# Patient Record
Sex: FEMALE | Race: WHITE | Employment: FULL TIME | URBAN - METROPOLITAN AREA
[De-identification: names, ages, dates, MRNs, and addresses within clinical notes are randomized per-mention and may not be internally consistent; named-entity substitution may affect disease eponyms.]

---

## 2016-09-29 LAB
HBV SURFACE AG SERPL QL IA: NEGATIVE
HIV 1+2 AB+HIV1 P24 AG SERPL QL IA: NEGATIVE
RUBELLA ANTIBODY IGG QUANTITATIVE: NORMAL IU/ML
T PALLIDUM IGG SER QL: NEGATIVE

## 2017-04-14 LAB — GROUP B STREP PCR: NEGATIVE

## 2017-05-04 ENCOUNTER — ANESTHESIA EVENT (OUTPATIENT)
Dept: OBGYN | Facility: CLINIC | Age: 32
End: 2017-05-04
Payer: COMMERCIAL

## 2017-05-04 ENCOUNTER — ANESTHESIA (OUTPATIENT)
Dept: OBGYN | Facility: CLINIC | Age: 32
End: 2017-05-04
Payer: COMMERCIAL

## 2017-05-04 ENCOUNTER — HOSPITAL ENCOUNTER (INPATIENT)
Facility: CLINIC | Age: 32
LOS: 1 days | Discharge: HOME | End: 2017-05-05
Attending: ADVANCED PRACTICE MIDWIFE | Admitting: ADVANCED PRACTICE MIDWIFE
Payer: COMMERCIAL

## 2017-05-04 LAB
ABO + RH BLD: NORMAL
ABO + RH BLD: NORMAL
BASOPHILS # BLD AUTO: 0 10E9/L (ref 0–0.2)
BASOPHILS NFR BLD AUTO: 0.1 %
BLD GP AB SCN SERPL QL: NORMAL
BLOOD BANK CMNT PATIENT-IMP: NORMAL
DIFFERENTIAL METHOD BLD: ABNORMAL
EOSINOPHIL # BLD AUTO: 0 10E9/L (ref 0–0.7)
EOSINOPHIL NFR BLD AUTO: 0.1 %
ERYTHROCYTE [DISTWIDTH] IN BLOOD BY AUTOMATED COUNT: 13.2 % (ref 10–15)
HCT VFR BLD AUTO: 36.6 % (ref 35–47)
HGB BLD-MCNC: 12.7 G/DL (ref 11.7–15.7)
IMM GRANULOCYTES # BLD: 0.1 10E9/L (ref 0–0.4)
IMM GRANULOCYTES NFR BLD: 0.3 %
LYMPHOCYTES # BLD AUTO: 1 10E9/L (ref 0.8–5.3)
LYMPHOCYTES NFR BLD AUTO: 4.9 %
MCH RBC QN AUTO: 31.8 PG (ref 26.5–33)
MCHC RBC AUTO-ENTMCNC: 34.7 G/DL (ref 31.5–36.5)
MCV RBC AUTO: 92 FL (ref 78–100)
MONOCYTES # BLD AUTO: 1.6 10E9/L (ref 0–1.3)
MONOCYTES NFR BLD AUTO: 8.1 %
NEUTROPHILS # BLD AUTO: 16.6 10E9/L (ref 1.6–8.3)
NEUTROPHILS NFR BLD AUTO: 86.5 %
NRBC # BLD AUTO: 0 10*3/UL
NRBC BLD AUTO-RTO: 0 /100
PLATELET # BLD AUTO: 250 10E9/L (ref 150–450)
RBC # BLD AUTO: 3.99 10E12/L (ref 3.8–5.2)
SPECIMEN EXP DATE BLD: NORMAL
WBC # BLD AUTO: 19.2 10E9/L (ref 4–11)

## 2017-05-04 PROCEDURE — 86780 TREPONEMA PALLIDUM: CPT | Performed by: ADVANCED PRACTICE MIDWIFE

## 2017-05-04 PROCEDURE — 37000011 ZZH ANESTHESIA WARD SERVICE

## 2017-05-04 PROCEDURE — 0KQM0ZZ REPAIR PERINEUM MUSCLE, OPEN APPROACH: ICD-10-PCS | Performed by: ADVANCED PRACTICE MIDWIFE

## 2017-05-04 PROCEDURE — 25000125 ZZHC RX 250: Performed by: SURGERY

## 2017-05-04 PROCEDURE — 25000128 H RX IP 250 OP 636: Performed by: SURGERY

## 2017-05-04 PROCEDURE — 25000125 ZZHC RX 250: Performed by: ADVANCED PRACTICE MIDWIFE

## 2017-05-04 PROCEDURE — 25000128 H RX IP 250 OP 636: Performed by: ADVANCED PRACTICE MIDWIFE

## 2017-05-04 PROCEDURE — 12000037 ZZH R&B POSTPARTUM INTERMEDIATE

## 2017-05-04 PROCEDURE — 86900 BLOOD TYPING SEROLOGIC ABO: CPT | Performed by: ADVANCED PRACTICE MIDWIFE

## 2017-05-04 PROCEDURE — 86850 RBC ANTIBODY SCREEN: CPT | Performed by: ADVANCED PRACTICE MIDWIFE

## 2017-05-04 PROCEDURE — 3E0R3CZ INTRODUCTION OF REGIONAL ANESTHETIC INTO SPINAL CANAL, PERCUTANEOUS APPROACH: ICD-10-PCS | Performed by: SURGERY

## 2017-05-04 PROCEDURE — 25000132 ZZH RX MED GY IP 250 OP 250 PS 637: Performed by: ADVANCED PRACTICE MIDWIFE

## 2017-05-04 PROCEDURE — 85025 COMPLETE CBC W/AUTO DIFF WBC: CPT | Performed by: ADVANCED PRACTICE MIDWIFE

## 2017-05-04 PROCEDURE — 25800025 ZZH RX 258: Performed by: ADVANCED PRACTICE MIDWIFE

## 2017-05-04 PROCEDURE — 86901 BLOOD TYPING SEROLOGIC RH(D): CPT | Performed by: ADVANCED PRACTICE MIDWIFE

## 2017-05-04 PROCEDURE — 00HU33Z INSERTION OF INFUSION DEVICE INTO SPINAL CANAL, PERCUTANEOUS APPROACH: ICD-10-PCS | Performed by: SURGERY

## 2017-05-04 PROCEDURE — 36415 COLL VENOUS BLD VENIPUNCTURE: CPT | Performed by: ADVANCED PRACTICE MIDWIFE

## 2017-05-04 PROCEDURE — 72200001 ZZH LABOR CARE VAGINAL DELIVERY SINGLE

## 2017-05-04 RX ORDER — OXYCODONE AND ACETAMINOPHEN 5; 325 MG/1; MG/1
1 TABLET ORAL
Status: DISCONTINUED | OUTPATIENT
Start: 2017-05-04 | End: 2017-05-04

## 2017-05-04 RX ORDER — OXYTOCIN/0.9 % SODIUM CHLORIDE 30/500 ML
1-24 PLASTIC BAG, INJECTION (ML) INTRAVENOUS CONTINUOUS
Status: DISCONTINUED | OUTPATIENT
Start: 2017-05-04 | End: 2017-05-04

## 2017-05-04 RX ORDER — NALOXONE HYDROCHLORIDE 0.4 MG/ML
.1-.4 INJECTION, SOLUTION INTRAMUSCULAR; INTRAVENOUS; SUBCUTANEOUS
Status: DISCONTINUED | OUTPATIENT
Start: 2017-05-04 | End: 2017-05-05 | Stop reason: HOSPADM

## 2017-05-04 RX ORDER — ROPIVACAINE HYDROCHLORIDE 2 MG/ML
10 INJECTION, SOLUTION EPIDURAL; INFILTRATION; PERINEURAL ONCE
Status: COMPLETED | OUTPATIENT
Start: 2017-05-04 | End: 2017-05-04

## 2017-05-04 RX ORDER — AMOXICILLIN 250 MG
1-2 CAPSULE ORAL 2 TIMES DAILY
Status: DISCONTINUED | OUTPATIENT
Start: 2017-05-04 | End: 2017-05-05 | Stop reason: HOSPADM

## 2017-05-04 RX ORDER — NALOXONE HYDROCHLORIDE 0.4 MG/ML
.1-.4 INJECTION, SOLUTION INTRAMUSCULAR; INTRAVENOUS; SUBCUTANEOUS
Status: DISCONTINUED | OUTPATIENT
Start: 2017-05-04 | End: 2017-05-04

## 2017-05-04 RX ORDER — BISACODYL 10 MG
10 SUPPOSITORY, RECTAL RECTAL DAILY PRN
Status: DISCONTINUED | OUTPATIENT
Start: 2017-05-06 | End: 2017-05-05 | Stop reason: HOSPADM

## 2017-05-04 RX ORDER — EPHEDRINE SULFATE 50 MG/ML
5 INJECTION, SOLUTION INTRAMUSCULAR; INTRAVENOUS; SUBCUTANEOUS
Status: DISCONTINUED | OUTPATIENT
Start: 2017-05-04 | End: 2017-05-04

## 2017-05-04 RX ORDER — ACETAMINOPHEN 325 MG/1
650 TABLET ORAL EVERY 4 HOURS PRN
Status: DISCONTINUED | OUTPATIENT
Start: 2017-05-04 | End: 2017-05-05 | Stop reason: HOSPADM

## 2017-05-04 RX ORDER — OXYTOCIN/0.9 % SODIUM CHLORIDE 30/500 ML
340 PLASTIC BAG, INJECTION (ML) INTRAVENOUS CONTINUOUS PRN
Status: DISCONTINUED | OUTPATIENT
Start: 2017-05-04 | End: 2017-05-05 | Stop reason: HOSPADM

## 2017-05-04 RX ORDER — OXYTOCIN/0.9 % SODIUM CHLORIDE 30/500 ML
100 PLASTIC BAG, INJECTION (ML) INTRAVENOUS CONTINUOUS
Status: DISCONTINUED | OUTPATIENT
Start: 2017-05-04 | End: 2017-05-05

## 2017-05-04 RX ORDER — SODIUM CHLORIDE, SODIUM LACTATE, POTASSIUM CHLORIDE, CALCIUM CHLORIDE 600; 310; 30; 20 MG/100ML; MG/100ML; MG/100ML; MG/100ML
INJECTION, SOLUTION INTRAVENOUS CONTINUOUS
Status: DISCONTINUED | OUTPATIENT
Start: 2017-05-04 | End: 2017-05-04

## 2017-05-04 RX ORDER — OXYTOCIN/0.9 % SODIUM CHLORIDE 30/500 ML
100-340 PLASTIC BAG, INJECTION (ML) INTRAVENOUS CONTINUOUS PRN
Status: DISCONTINUED | OUTPATIENT
Start: 2017-05-04 | End: 2017-05-04

## 2017-05-04 RX ORDER — CARBOPROST TROMETHAMINE 250 UG/ML
250 INJECTION, SOLUTION INTRAMUSCULAR
Status: DISCONTINUED | OUTPATIENT
Start: 2017-05-04 | End: 2017-05-04

## 2017-05-04 RX ORDER — IBUPROFEN 800 MG/1
800 TABLET, FILM COATED ORAL
Status: DISCONTINUED | OUTPATIENT
Start: 2017-05-04 | End: 2017-05-04

## 2017-05-04 RX ORDER — LIDOCAINE 40 MG/G
CREAM TOPICAL
Status: DISCONTINUED | OUTPATIENT
Start: 2017-05-04 | End: 2017-05-04

## 2017-05-04 RX ORDER — ONDANSETRON 2 MG/ML
4 INJECTION INTRAMUSCULAR; INTRAVENOUS EVERY 6 HOURS PRN
Status: DISCONTINUED | OUTPATIENT
Start: 2017-05-04 | End: 2017-05-04

## 2017-05-04 RX ORDER — IBUPROFEN 400 MG/1
400-800 TABLET, FILM COATED ORAL EVERY 6 HOURS PRN
Status: DISCONTINUED | OUTPATIENT
Start: 2017-05-04 | End: 2017-05-05 | Stop reason: HOSPADM

## 2017-05-04 RX ORDER — METHYLERGONOVINE MALEATE 0.2 MG/ML
200 INJECTION INTRAVENOUS
Status: DISCONTINUED | OUTPATIENT
Start: 2017-05-04 | End: 2017-05-04

## 2017-05-04 RX ORDER — ACETAMINOPHEN 325 MG/1
650 TABLET ORAL EVERY 4 HOURS PRN
Status: DISCONTINUED | OUTPATIENT
Start: 2017-05-04 | End: 2017-05-04

## 2017-05-04 RX ORDER — FENTANYL CITRATE 50 UG/ML
100 INJECTION, SOLUTION INTRAMUSCULAR; INTRAVENOUS ONCE
Status: COMPLETED | OUTPATIENT
Start: 2017-05-04 | End: 2017-05-04

## 2017-05-04 RX ORDER — OXYTOCIN 10 [USP'U]/ML
10 INJECTION, SOLUTION INTRAMUSCULAR; INTRAVENOUS
Status: COMPLETED | OUTPATIENT
Start: 2017-05-04 | End: 2017-05-04

## 2017-05-04 RX ORDER — OXYTOCIN/0.9 % SODIUM CHLORIDE 30/500 ML
PLASTIC BAG, INJECTION (ML) INTRAVENOUS
Status: DISCONTINUED
Start: 2017-05-04 | End: 2017-05-04 | Stop reason: HOSPADM

## 2017-05-04 RX ORDER — HYDROCORTISONE 2.5 %
CREAM (GRAM) TOPICAL 3 TIMES DAILY PRN
Status: DISCONTINUED | OUTPATIENT
Start: 2017-05-04 | End: 2017-05-05 | Stop reason: HOSPADM

## 2017-05-04 RX ORDER — MISOPROSTOL 200 UG/1
400 TABLET ORAL
Status: DISCONTINUED | OUTPATIENT
Start: 2017-05-04 | End: 2017-05-05 | Stop reason: HOSPADM

## 2017-05-04 RX ORDER — NALBUPHINE HYDROCHLORIDE 10 MG/ML
2.5-5 INJECTION, SOLUTION INTRAMUSCULAR; INTRAVENOUS; SUBCUTANEOUS EVERY 6 HOURS PRN
Status: DISCONTINUED | OUTPATIENT
Start: 2017-05-04 | End: 2017-05-04

## 2017-05-04 RX ORDER — LANOLIN 100 %
OINTMENT (GRAM) TOPICAL
Status: DISCONTINUED | OUTPATIENT
Start: 2017-05-04 | End: 2017-05-05 | Stop reason: HOSPADM

## 2017-05-04 RX ORDER — OXYTOCIN 10 [USP'U]/ML
10 INJECTION, SOLUTION INTRAMUSCULAR; INTRAVENOUS
Status: DISCONTINUED | OUTPATIENT
Start: 2017-05-04 | End: 2017-05-05 | Stop reason: HOSPADM

## 2017-05-04 RX ADMIN — SODIUM CHLORIDE, POTASSIUM CHLORIDE, SODIUM LACTATE AND CALCIUM CHLORIDE: 600; 310; 30; 20 INJECTION, SOLUTION INTRAVENOUS at 11:00

## 2017-05-04 RX ADMIN — FENTANYL CITRATE 100 MCG: 50 INJECTION, SOLUTION INTRAMUSCULAR; INTRAVENOUS at 11:07

## 2017-05-04 RX ADMIN — Medication 2 MILLI-UNITS/MIN: at 13:55

## 2017-05-04 RX ADMIN — SENNOSIDES AND DOCUSATE SODIUM 1 TABLET: 8.6; 5 TABLET ORAL at 22:52

## 2017-05-04 RX ADMIN — ROPIVACAINE HYDROCHLORIDE 3 ML: 2 INJECTION, SOLUTION EPIDURAL; INFILTRATION at 11:07

## 2017-05-04 RX ADMIN — LIDOCAINE HYDROCHLORIDE 20 ML: 10 INJECTION, SOLUTION INFILTRATION; PERINEURAL at 18:35

## 2017-05-04 RX ADMIN — IBUPROFEN 800 MG: 400 TABLET ORAL at 20:46

## 2017-05-04 RX ADMIN — SODIUM CHLORIDE, POTASSIUM CHLORIDE, SODIUM LACTATE AND CALCIUM CHLORIDE 1000 ML: 600; 310; 30; 20 INJECTION, SOLUTION INTRAVENOUS at 10:10

## 2017-05-04 RX ADMIN — ROPIVACAINE HYDROCHLORIDE 3 ML: 2 INJECTION, SOLUTION EPIDURAL; INFILTRATION at 11:06

## 2017-05-04 RX ADMIN — ROPIVACAINE HYDROCHLORIDE 4 ML: 2 INJECTION, SOLUTION EPIDURAL; INFILTRATION at 11:08

## 2017-05-04 RX ADMIN — Medication 10 ML/HR: at 11:30

## 2017-05-04 RX ADMIN — ACETAMINOPHEN 650 MG: 325 TABLET, FILM COATED ORAL at 20:46

## 2017-05-04 RX ADMIN — OXYTOCIN 10 UNITS: 10 INJECTION, SOLUTION INTRAMUSCULAR; INTRAVENOUS at 18:05

## 2017-05-04 NOTE — ANESTHESIA PREPROCEDURE EVALUATION
"Procedure: * No procedures listed *  Preop diagnosis: * No pre-op diagnosis entered *    Allergies   Allergen Reactions     Seasonal Allergies      No past medical history on file.  Past Surgical History:   Procedure Laterality Date     DILATION AND CURETTAGE SUCTION N/A 10/2/2015    Procedure: DILATION AND CURETTAGE SUCTION;  Surgeon: Dalia Lucas MD;  Location: [facility]     ENT SURGERY      wisdom teeth extraction @ age 27     GYN SURGERY      suction D & C 10/2015     Prior to Admission medications    Medication Sig Start Date End Date Taking? Authorizing Provider   Prenatal MV-Min-Fe Fum-FA-DHA (PRENATAL 1 PO)    Yes Reported, Patient     Current Facility-Administered Medications Ordered in Epic   Medication Dose Route Frequency Last Rate Last Dose     lactated ringers infusion   Intravenous Continuous 125 mL/hr at 05/04/17 1100       lactated ringers BOLUS 500 mL  500 mL Intravenous Once PRN         acetaminophen (TYLENOL) tablet 650 mg  650 mg Oral Q4H PRN         naloxone (NARCAN) injection 0.1-0.4 mg  0.1-0.4 mg Intravenous Q2 Min PRN         ondansetron (ZOFRAN) injection 4 mg  4 mg Intravenous Q6H PRN         oxytocin (PITOCIN) injection 10 Units  10 Units Intramuscular Once PRN         oxytocin (PITOCIN) 30 units in 500 mL 0.9% NaCl infusion  100-340 mL/hr Intravenous Continuous PRN         Medication Instructions: misoprostol (CYTOTEC)- Nurse to discuss ordering with provider, if needed. Ordered via \"OB misoprostol (CYTOTEC) Postpartum Hemorrhage PANEL\"   Does not apply Continuous PRN         methylergonovine (METHERGINE) injection 200 mcg  200 mcg Intramuscular Once PRN         carboprost (HEMABATE) injection 250 mcg  250 mcg Intramuscular Once PRN         lidocaine 1 % 0.1-20 mL  0.1-20 mL Subcutaneous Once PRN         ibuprofen (ADVIL/MOTRIN) tablet 800 mg  800 mg Oral Once PRN         oxyCODONE-acetaminophen (PERCOCET) 5-325 MG per tablet 1 tablet  1 tablet Oral Once PRN         medication " instruction   Does not apply Continuous PRN         Opioid plan postpartum - medication instruction   Does not apply Continuous PRN         ropivacaine (NAROPIN) injection 10 mL  10 mL EPIDURAL Once         fentaNYL Citrate (PF) (SUBLIMAZE) injection 100 mcg  100 mcg EPIDURAL Once         fentaNYL (SUBLIMAZE) 2 mcg/mL, ropivacaine (NAROPIN) 0.2% in NaCl 0.9% EPIDURAL infusion   EPIDURAL Continuous 10 mL/hr at 05/04/17 1130 10 mL/hr at 05/04/17 1130     lactated ringers BOLUS 250 mL  250 mL Intravenous Once PRN         ePHEDrine injection 5 mg  5 mg Intravenous Q3 Min PRN         nalbuphine (NUBAIN) injection 2.5-5 mg  2.5-5 mg Intravenous Q6H PRN         naloxone (NARCAN) injection 0.1-0.4 mg  0.1-0.4 mg Intravenous Q2 Min PRN         medication instruction   Does not apply Continuous PRN         oxytocin in 0.9% NaCl (PITOCIN) 30 units/500 mL infusion             lidocaine 1 % 1 mL  1 mL Other Q1H PRN         lidocaine (LMX4) cream   Topical Q1H PRN         sodium chloride (PF) 0.9% PF flush 3 mL  3 mL Intracatheter Q1H PRN         sodium chloride (PF) 0.9% PF flush 3 mL  3 mL Intracatheter Q8H         oxytocin (PITOCIN) 30 units in 500 mL 0.9% NaCl infusion  1-24 jac-units/min Intravenous Continuous         No current [facility]-ordered outpatient prescriptions on file.     Wt Readings from Last 1 Encounters:   05/04/17 56.2 kg (124 lb)     Temp Readings from Last 1 Encounters:   05/04/17 37.4  C (99.4  F) (Temporal)     BP Readings from Last 6 Encounters:   05/04/17 100/55   10/02/15 113/77   06/06/15 92/62     Pulse Readings from Last 4 Encounters:   05/04/17 87   10/02/15 79   06/06/15 73     Resp Readings from Last 1 Encounters:   05/04/17 16     SpO2 Readings from Last 1 Encounters:   05/04/17 94%     No results for input(s): NA, POTASSIUM, CHLORIDE, CO2, ANIONGAP, GLC, BUN, CR, GEREMIAS in the last 41501 hours.  Recent Labs   Lab Test  05/04/17   1055   WBC  19.2*   HGB  12.7   PLT  250     No results for input(s):  INR in the last 67162 hours.    Invalid input(s): APTT   RECENT LABS:   ECG:   ECHO:   CXR:      Anesthesia Evaluation       history and physical reviewed .             ROS/MED HX    ENT/Pulmonary:  - neg pulmonary ROS     Neurologic:  - neg neurologic ROS     Cardiovascular:  - neg cardiovascular ROS       METS/Exercise Tolerance:     Hematologic:         Musculoskeletal:         GI/Hepatic:  - neg GI/hepatic ROS       Renal/Genitourinary:         Endo:         Psychiatric:         Infectious Disease:         Malignancy:         Other:                     Physical Exam  Normal systems: cardiovascular and pulmonary    Airway   Mallampati: II  TM distance: > 3 FB  Neck ROM: full  Mouth opening: > 3 cm    Dental     Cardiovascular       Pulmonary           neg OB ROS                 Anesthesia Plan      History & Physical Review      ASA Status:  .  OB Epidural Asa: 2            Postoperative Care      Consents  Anesthetic plan, risks, benefits and alternatives discussed with:  Patient..                          .

## 2017-05-04 NOTE — PROGRESS NOTES
"S: Adan is resting between ctx and getting small periods of sleep. She perceives right lower back pain with some ctx otherwise describes an effective epidural. Continued LOF clear in color. Drinking between periods of wakefullness. Support team at bedside.  O: VSS /55  Pulse 87  Temp 99.4  F (37.4  C) (Temporal)  Resp 16  Ht 1.499 m (4' 11\")  Wt 56.2 kg (124 lb)  LMP 2016  SpO2 94%  BMI 25.04 kg/m2  Ctx spaced every 7-10 minutes, firm to palpation, 60-90 seconds in duration. FHR 130s with moderate variability, positive accelerations, no decelerations. SVE deferred at this time.  A: Adan is a 33 yo  at 39.3 weeks of gestation  Epidural pain management in place  GBS negative  B+ blood type  Category I FHR tracing  P: Discussed at length labor pattern and benefits, risks, and alternatives to augmentation of labor with pitocin. Pt and partner asked appropriate questions and verbalized understanding. Pt agreed to plan of care to start pitocin at this time.  Start pitocin and increase as tolerated per protocol.  Continue epidural pain relief  Labor down and rest  Encourage frequent position changes and fluids as tolerated  VS and CEFM per policy  Reassess in 2-3 hours or PRN    CORIE Perez, CNM  "

## 2017-05-04 NOTE — IP AVS SNAPSHOT
[address]    [place] [address]    Phone:  [phone]                                       After Visit Summary   5/4/2017    Adan Sheridan    MRN: [id number]           After Visit Summary Signature Page     I have received my discharge instructions, and my questions have been answered. I have discussed any challenges I see with this plan with the nurse or doctor.    ..........................................................................................................................................  Patient/Patient Representative Signature      ..........................................................................................................................................  Patient Representative Print Name and Relationship to Patient    ..................................................               ................................................  Date                                            Time    ..........................................................................................................................................  Reviewed by Signature/Title    ...................................................              ..............................................  Date                                                            Time

## 2017-05-04 NOTE — PLAN OF CARE
1005: Patient arrived to floor. Patient had been at the [facility] and had not progressed from 9.5cm dilation and was requesting to have an epidural. Patient is 39+3. Monitors applied with patient consent. IV placed and IV fluids started for epidural process.

## 2017-05-04 NOTE — H&P
May 4, 2017    Adan Sheridan  [id number]            OB Admit History & Physical      Ms. Sheridan  is here as a transfer from [facility] where she has been in early labor since  and active labor since  on 5/3. She currently reports very intense ctx and LBP for which she desired pain management. She has been well hydrated and eating small amounts frequently. She is able to void but needed straight cath which yielded 500 cc clear urine at 0900. Her support team includes her  Abdulkadir and her  Sofya. AROM at 0500 for clear fluid. She continues to leak clear fluid.      No LMP recorded. Patient is pregnant. LMP is 16  Her Estimated Date of Delivery: 17 Data Unavailable  , making her 39.3 wks.      Estimated body mass index is 19.51 kg/(m^2) as calculated from the following:    Height as of 10/2/15: 1.524 m (5').    Weight as of 10/2/15: 45.3 kg (99 lb 14.4 oz).  Her prenatal course has been normal and healthy. She has a history of a molar pregnancy.    See prenatal for labs.  Negative GBS, Rubella  Immune, RH positive    Estimated fetal weight= 7 lbs       She is a 32 year old   Her OB history:   Obstetric History       T0      TAB0   SAB0   E0   M0   L0       # Outcome Date GA Lbr Jose Elias/2nd Weight Sex Delivery Anes PTL Lv   1 Current                      No past medical history on file.  History significant for molar pregnancy in     Past Surgical History:   Procedure Laterality Date     DILATION AND CURETTAGE SUCTION N/A 10/2/2015    Procedure: DILATION AND CURETTAGE SUCTION;  Surgeon: Dalia Lucas MD;  Location: [facility]     ENT SURGERY      wisdom teeth extraction @ age 27     GYN SURGERY      suction D & C 10/2015         No current outpatient prescriptions on file.       Allergies: Seasonal allergies      REVIEW OF SYSTEMS:  NEUROLOGIC:  Negative  EYES:  Negative  ENT:  Negative  GI:  Negative  BREAST:  Negative  :  Negative  GYN:  Negative  CV:   Negative  PULMONARY:  Negative  MUSCULOSKELETAL:  Negative  PSYCH:  Negative        Social History     Social History     Marital status:      Spouse name: N/A     Number of children: N/A     Years of education: N/A     Occupational History     Not on file.     Social History Main Topics     Smoking status: Never Smoker     Smokeless tobacco: Never Used     Alcohol use Yes      Comment: occasional     Drug use: Not on file     Sexual activity: Not on file     Other Topics Concern     Not on file     Social History Narrative      No family history on file.          Vitals:   FHT in the 130s, positive accelerations, no decelerations.  With contractions every  5-6min, lasting 90 seconds and firm to palpation.    Alert Awake in NAD  HEENT grossly normal  Neck: no lymphadenopathy or thryoidomegaly  Lungs clear bilaterally to auscultation  Heart RRR  ABD gravid, non-tender exam with contractions palpable  Pelvic:  clear fluid noted, no blood noted  Cervix is 9.5 cm / 100% effaced at -1 station  EXT:  no edema or calf tenderness  Neuro:  AAOx3    Assessment:  Adan is a 31 yo  at 39.3 who is in active labor and desires epidural pain management    Plan:  Admit to labor and delivery  Consultation with anesthesia  Encourage rest and frequent position changes  Ice chips as tolerated  VS and CEFM per policy    Yadira Encinas, APRN, CNM      Yadira Encinas MD  Dept of OB/GYN  May 4, 2017

## 2017-05-04 NOTE — PLAN OF CARE
Viable baby boy born at 1747. Apgars 8/9. Placenta delivered at 1756. Baby placed skin to skin at 1748. LINDSEY Encinas CNM at delivery and continuously at bedside since 1530. Patient also had 1:1 care with a . Dr. Crowe present for vaginal repair.

## 2017-05-04 NOTE — ANESTHESIA PROCEDURE NOTES
Peripheral nerve/Neuraxial procedure note : epidural catheter  Pre-Procedure  Performed by CLINTON ANDERSEN  Location: OB      Pre-Anesthestic Checklist: patient identified, IV checked, risks and benefits discussed, informed consent, monitors and equipment checked, pre-op evaluation and at physician/surgeon's request    Timeout  Correct Patient: Yes   Correct Procedure: Yes   Correct Site: Yes   Correct Laterality: N/A   Correct Position: Yes   Site Marked: N/A   .   Procedure Documentation    .    Procedure:    Epidural catheter.  Insertion Site:L3-4  (midline approach) Injection technique: LORT saline   Local skin infiltrated with 3 mL of 1% lidocaine.  NISHI at 5 cm     Patient Prep;mask, sterile gloves, povidone-iodine 7.5% surgical scrub, patient draped.  .  Needle: Touhy needle (17 G. 3.5 in). # of attempts: 1. # of redirects:: 0..  Spinal Needle: . . . Catheter: 19 G . .  Catheter threaded easily  4 cm epidural space.  9 cm at skin.   .    Assessment/Narrative  Paresthesias: No.  .  .  Aspiration negative for heme or CSF  . Test dose of 3 mL lidocaine 1.5% w/ 1:200,000 epinephrine at. Test dose negative for signs of intravascular, subdural or intrathecal injection. Comments:  Pt tolerated well. No complications.   Catheter taped sterile and securely with sterile medical adhesive spray and tegaderm.   Pt placed back in supine with HARRY.   FHTs stable post-procedure.

## 2017-05-04 NOTE — PROGRESS NOTES
"S: Adan is feeling comfortable with epidural in place. She is tolerating most ctx with an occasional painful one which is felt mostly in her right lower back. She is drinking apple juice and making frequent position changes. Reports continued LOF which remains clear. Support team bedside.  O: VSS /67  Pulse 87  Temp 99.4  F (37.4  C) (Temporal)  Resp 18  Ht 1.499 m (4' 11\")  Wt 56.2 kg (124 lb)  LMP 2016  SpO2 95%  BMI 25.04 kg/m2  Ctx every 6 minutes and firm to palpation, lasting  seconds. FHR 130s with moderate variability, periods of minimal variability consistent with fetal sleeping patterns, accelerations present, no decelerations present.  SVE A Lip/100/-1, clear fluid. Bladder straight cathed for approximately 400 cc clear urine.  A: Adan is a 31 yo  at 39.3 weeks gestation in active labor  Epidural pain management in place  GBS negative  B+ blood type  Category I FHR tracing  P: Continue epidural pain relief  Labor down and rest  Encourage frequent position changes and fluids as tolerated  VS and CEFM per policy  Reassess in 2-3 hours or PRN      Yadira Encinas, CORIE, CNM  "

## 2017-05-04 NOTE — IP AVS SNAPSHOT
MRN:[id number]                      After Visit Summary   5/4/2017    Adan Sheridan    MRN: [id number]           Thank you!     Thank you for choosing [facility] for your care. Our goal is always to provide you with excellent care. Hearing back from our patients is one way we can continue to improve our services. Please take a few minutes to complete the written survey that you may receive in the mail after you visit with us. Thank you!        Patient Information     Date Of Birth          1985        About your hospital stay     You were admitted on:  May 4, 2017 You last received care in the:  [address]    You were discharged on:  May 5, 2017       Who to Call     For medical emergencies, please call 911.  For non-urgent questions about your medical care, please call your primary care provider or clinic, None          Attending Provider     Provider Specialty    Tamica Diaz APRN [facility]       Primary Care Provider    None       No address on file        After Care Instructions     Activity       Review discharge instructions            Diet       Resume previous diet            Discharge Instructions - Postpartum visit       Schedule postpartum visit with [facility] at 2 weeks and 6 weeks postpartum. Please call clinic [phone] to schedule.                  Additional Services     LACTATION REFERRAL       Your provider has referred you to: OTHER PROVIDERS: [facility] and [facility]    Please be aware that coverage of these services is subject to the terms and limitations of your health insurance plan.  Call member services at your health plan with any benefit or coverage questions.      Please bring the following with you to your appointment:    (1) Any X-Rays, CTs or MRIs which have been performed.  Contact the facility where they were done to arrange for  prior to your scheduled appointment.    (2) List of current  medications  (3) This referral request   (4) Any documents/labs given to you for this referral                  Further instructions from your care team       Postpartum Vaginal Delivery Instructions    Activity       Ask family and friends for help when you need it.    Do not place anything in your vagina for 6 weeks.    You are not restricted on other activities, but take it easy for a few weeks to allow your body to recover from delivery.  You are able to do any activities you feel up to that point.    No driving until you have stopped taking your pain medications (usually two weeks after delivery).     Call your health care provider if you have any of these symptoms:       Increased pain, swelling, redness, or fluid around your stiches from an episiotomy or perineal tear.    A fever above 100.4 F (38 C) with or without chills when placing a thermometer under your tongue.    You soak a sanitary pad with blood within 1 hour, or you see blood clots larger than a golf ball.    Bleeding that lasts more than 6 weeks.    Vaginal discharge that smells bad.    Severe pain, cramping or tenderness in your lower belly area.    A need to urinate more frequently (use the toilet more often), more urgently (use the toilet very quickly), or it burns when you urinate.    Nausea and vomiting.    Redness, swelling or pain around a vein in your leg.    Problems breastfeeding or a red or painful area on your breast.    Chest pain and cough or are gasping for air.    Problems coping with sadness, anxiety, or depression.  If you have any concerns about hurting yourself or the baby, call your provider immediately.     You have questions or concerns after you return home.     Keep your hands clean:  Always wash your hands before touching your perineal area and stitches.  This helps reduce your risk of infection.  If your hands aren't dirty, you may use an alcohol hand-rub to clean your hands. Keep your nails clean and short.   "      Pending Results     No orders found for last 3 day(s).            Statement of Approval     Ordered          17 1019  I have reviewed and agree with all the recommendations and orders detailed in this document.  EFFECTIVE NOW     Approved and electronically signed by:  Anaid Joseph CNM             Admission Information     Date & Time Provider Department Dept. Phone    2017 Tamica Diaz APRN CNM [address] [phone]      Your Vitals Were     Blood Pressure Pulse Temperature Respirations Height Weight    106/70 79 98.3  F (36.8  C) (Oral) 16 1.499 m (4' 11\") 56.2 kg (124 lb)    Last Period Pulse Oximetry BMI (Body Mass Index)             2016 91% 25.04 kg/m2         MyChart Information     [employer] lets you send messages to your doctor, view your test results, renew your prescriptions, schedule appointments and more. To sign up, go to [email or web address] . Click on \"Log in\" on the left side of the screen, which will take you to the Welcome page. Then click on \"Sign up Now\" on the right side of the page.     You will be asked to enter the access code listed below, as well as some personal information. Please follow the directions to create your username and password.     Your access code is: [email or web address]  Expires: 8/3/2017  6:27 PM     Your access code will  in 90 days. If you need help or a new code, please call your [facility] clinic or [phone].        Care EveryWhere ID     This is your Care EveryWhere ID. This could be used by other organizations to access your [facility] medical records  [id number]           Review of your medicines      CONTINUE these medicines which have NOT CHANGED        Dose / Directions    PRENATAL 1 PO        Refills:  0                Protect others around you: Learn how to safely use, store and throw away your medicines at www.disposemymeds.org.             Medication List: This is a list of all " your medications and when to take them. Check marks below indicate your daily home schedule. Keep this list as a reference.      Medications           Morning Afternoon Evening Bedtime As Needed    PRENATAL 1 PO

## 2017-05-05 VITALS
WEIGHT: 124 LBS | HEART RATE: 79 BPM | HEIGHT: 59 IN | OXYGEN SATURATION: 91 % | TEMPERATURE: 98.3 F | BODY MASS INDEX: 25 KG/M2 | RESPIRATION RATE: 16 BRPM | SYSTOLIC BLOOD PRESSURE: 106 MMHG | DIASTOLIC BLOOD PRESSURE: 70 MMHG

## 2017-05-05 PROBLEM — S01.512A LACERATION OF LABIAL MUCOSA WITHOUT COMPLICATION: Status: ACTIVE | Noted: 2017-05-05

## 2017-05-05 LAB — T PALLIDUM IGG+IGM SER QL: NEGATIVE

## 2017-05-05 PROCEDURE — 25000132 ZZH RX MED GY IP 250 OP 250 PS 637: Performed by: ADVANCED PRACTICE MIDWIFE

## 2017-05-05 RX ADMIN — IBUPROFEN 800 MG: 400 TABLET ORAL at 04:02

## 2017-05-05 RX ADMIN — IBUPROFEN 800 MG: 400 TABLET ORAL at 09:32

## 2017-05-05 RX ADMIN — IBUPROFEN 800 MG: 400 TABLET ORAL at 14:45

## 2017-05-05 RX ADMIN — ACETAMINOPHEN 650 MG: 325 TABLET, FILM COATED ORAL at 14:45

## 2017-05-05 RX ADMIN — SENNOSIDES AND DOCUSATE SODIUM 1 TABLET: 8.6; 5 TABLET ORAL at 09:32

## 2017-05-05 RX ADMIN — ACETAMINOPHEN 650 MG: 325 TABLET, FILM COATED ORAL at 09:32

## 2017-05-05 RX ADMIN — ACETAMINOPHEN 650 MG: 325 TABLET, FILM COATED ORAL at 04:03

## 2017-05-05 NOTE — ANESTHESIA POSTPROCEDURE EVALUATION
Patient: Adan Sheridan    * No procedures listed *    Diagnosis:* No pre-op diagnosis entered *  Diagnosis Additional Information: No value filed.    Anesthesia Type:  No value filed.    Note:  Anesthesia Post Evaluation    Patient location during evaluation: bedside  Patient participation: Able to fully participate in evaluation  Level of consciousness: awake  Pain management: adequate  Airway patency: patent  Cardiovascular status: acceptable  Respiratory status: acceptable  Hydration status: acceptable  PONV: none     Anesthetic complications: None          Last vitals:  Vitals:    05/04/17 2334 05/05/17 0403 05/05/17 0859   BP: 107/68  102/68   Pulse:   79   Resp: 16 16 16   Temp: 37.1  C (98.7  F)  36.7  C (98  F)   SpO2:            Electronically Signed By: Katy Sheriff MD, MD  May 5, 2017  4:03 PM

## 2017-05-05 NOTE — PLAN OF CARE
Data: Adan Sheridan transferred to  room 423 via wheelchair at 2130. Baby transferred via parent's arms.  Action: Receiving unit notified of transfer: Yes. Patient and family notified of room change. Report given to Vicki FALCON RN at 2130. Belongings sent to receiving unit. Accompanied by Registered Nurse. Oriented patient to surroundings. Call light within reach. ID bands double-checked with receiving RN.  Response: Patient tolerated transfer and is stable.

## 2017-05-05 NOTE — PLAN OF CARE
Problem: Goal Outcome Summary  Goal: Goal Outcome Summary  Outcome: No Change  Pt taking tylenol and ibuprofen, VSS, tolerating diet, voiding adequate amounts, saline lock removed. Breastfeeding attempts overnight.

## 2017-05-05 NOTE — DISCHARGE SUMMARY
"S: Adan is approx 18 hours post  of a healthy baby boy \"Romario\" born yesterday 17 at 1747.   Adan is feeling well physically. Taking ibuprofen and Tylenol for mild uterine cramping and perineal discomfort. Notices her legs are sore from pushing in squatting position. Denies SOB, dizziness, extreme fatigue. Feeling well generally.   Doing well emotionally. Has good support with her  who is present & supportive.   Breastfeeding but reports baby boy is sleepy. Lactation came by this morning. Discussed offering breast q2 hours or more frequent. Skin to skin. Good latch. Football hold. Nipples are intact, no blisters/bleeding.   Perineum is sore especially with standing but manageable. 2nd degree laceration with labial involvement. Repaired.   Bleeding has decreased, only changing pads when up to the restroom q 2-4 hours.   Able to void fully without difficulty.   No BM but passing flatus. Taking stool softeners  Tolerating regular diet.     Desires discharge this evening after  screening. Eager to get home.     O: /68  Pulse 79  Temp 98  F (36.7  C) (Oral)  Resp 16  Ht 1.499 m (4' 11\")  Wt 56.2 kg (124 lb)  LMP 2016  SpO2 91%  Breastfeeding? Unknown  BMI 25.04 kg/m2  Breasts- nipples intact.   Fundus- U/2, firm  Lochia- small rubra  Perineum: well approximated, hemostatic, sutures intact. Edematous but soft, no s/sx of hematoma  No clonus present    A: 31 yo  who is 18 hours post   Lactating Mother  2nd degree laceration repaired    Plan. Ok to discharge this evening. Order placed.   Has breast pump already at home.   Reviewed breastfeeding, skin to skin, nipple care, bleeding, perineum care, pain medications/ supplements.   Reviewed warning signs and when to call midwife on call #.   Call for 2 week clinic appt and 6 week clinic appt.   Anaid FONTENOT CNM     "

## 2017-05-05 NOTE — PROCEDURES
Delivery Summary    Adan Sheridan MRN# [id number]   Age: 32 year old YOB: 1985     ASSESSMENT & PLAN: Adan is a 31 yo now  in the postpartum period after .  Continue standard postpartum care  Pain management  Encourage hydration and food.  Postpartum care for 24 hours.  To assess tomorrow during rounds or PRN.        Labor Event Times    Labor onset date:  5/3/17 Onset time:  11:00 PM   Dilation complete date:  17 Complete time:   3:18 PM   Start pushing date/time:  2017 1600            Labor Length    1st Stage (hrs):  16 (min):  18   2nd Stage (hrs):  2 (min):  29   3rd Stage (hrs):  0 (min):  9      Labor Events     labor?:  No    steroids:  None   Labor Type:  Spontaneous   Predominate monitoring during 1st stage:  continuous electronic fetal monitoring      Antibiotics received during labor?:  No      Rupture identifier:  Rupture 1   Rupture date/time: 17 0500   Rupture type:  Artificial Rupture of Membranes   Fluid color:  Clear   Fluid odor:  Normal      1:1 continuous labor support provided by?:   Labor partogram used?:  no         Delivery/Placenta Date and Time    Delivery Date:  17 Delivery Time:   5:47 PM   Placenta Date/Time:  2017  5:56 PM   Oxytocin given at the time of delivery:  after delivery of placenta      Vaginal Counts    Initial count performed by 2 team members:   Two Team Members   KIM Grace, JAGUAR          Needles Suture [place] Sponges Instruments   Initial counts 2  5    Added to count   5    Final counts 2         Placed during labor Accounted for at the end of labor      Final count performed by 2 team members:   Two Team Members   KIM Grace, JAGUAR               Apgars    Living status:  Yes    1 Minute 5 Minute 10 Minute 15 Minute 20 Minute   Skin color: 0  1       Heart rate: 2  2       Reflex irritability: 2  2       Muscle tone: 2  2       Respiratory effort: 2  2       Total: 8  9           Apgars assigned by:  HEIDI ADAMS RN      Cord    Vessels:  3 Vessels Complications:  Nuchal   Cord Blood Disposition:  Lab Gases Sent?:  No         [place] Resuscitation    Methods:  None      Output in Delivery Room:  Stool      Skin to Skin and Feeding Plan    Skin to skin initiation date/time: 17 1748   Skin to skin with:  Mother   Skin to skin end date/time:     How do you plan to feed your baby:  Breastfeeding      Labor Events and Shoulder Dystocia    Fetal Tracing Prior to Delivery:  Category 1   Shoulder dystocia present?:  Neg            Delivery (Maternal) (Provider to Complete) ([id number])    Episiotomy:  None   Perineal lacerations:  2nd Repaired?:  Yes   Periurethral laceration:  left Repaired?:  Yes   Labial laceration:  bilateral Repaired?:  Yes   Vaginal laceration?:  No    Cervical laceration?:  No    Vaginal delivery est. blood loss (mL):  250         Mother's Information  Mother: Adan Sheridan #[id number]    Start of Mother's Information     IO Blood Loss  17 2300 - 17 1935    Mom's I/O Activity            End of Mother's Information  Mother: Adan Sheridan #[id number]            Delivery - Provider to Complete ([id number])    Delivering clinician:  LAW POST   CNM Care:  Exclusive CNM care in labor   Attempted Delivery Types (Choose all that apply):  Spontaneous Vaginal Delivery   Delivery Type (Choose the 1 that will go to the Birth History):  Vaginal, Spontaneous Delivery                           Placenta    Delayed Cord Clamping:  Done   Date/Time:  2017  5:56 PM   Removal:  Spontaneous      Anesthesia    Method:  Epidural                  This CNM was called bedside when the Pt verbalized increased vaginal pressure. She pushed effectively in squatting positions in bed for an  of a viable male infant over an intact perineum at 1747. Head was delivered in SAMMY and restituted to ROT. Nuchal cord x1 noted and was not reducible. Infant was delivered via  somersault maneuver as shoulders and body delivered easily. Placed immediately to maternal chest with spontaneous cry, good tone, and color. Cord was then double clamped by this CNM and cut by FOB to reveal 3 vessel cord. Placenta then delivered spontaneously via Pyle maneuver with membranes and tissue appearing to be intact. Fundus massaged and assessed firm but displaced to maternal right. Bladder emptied for minimal urine. Fundus remained firm. Pitocin 10 mU administered IM prophylactically for prolonged labor. GYNN consult obtained for perineal repair including disrupted hymen, 2nd degree laceration, laceration to medial aspect of right labia minora, laceration to medial aspect of left labia minora, laceration to lateral aspect of left labia minora with bisection into a superior and inferior flap. Repaired by Dr. Neva Zepeda. Mother and infant bonding and stable. Sponge and sharp counts correct.    CORIE Naidu CNM

## 2017-05-05 NOTE — LACTATION NOTE
Initial Lactation visit. Hand out given. Recommend unlimited, frequent breast feedings: At least 8 - 12 times every 24 hours. Avoid pacifiers and supplementation with formula unless medically indicated. Explained benefits of holding baby skin on skin to help promote better breastfeeding outcomes. Will revisit as needed.    Madhavi Keane RN IBCLC

## 2017-05-05 NOTE — PLAN OF CARE
Problem: Goal Outcome Summary  Goal: Goal Outcome Summary  Outcome: Improving  Vss, fundus firm with scant flow. Kylie area swollen, using ice and tucks. Pain managed with tyelnol and ibuprofen. Pt. Requesting 24 hour discharge.

## 2017-05-05 NOTE — PROGRESS NOTES
GYN Consult requested for assistance with perineal repair.  33 yo P1 who had a 2 hour second stage and  over intact perineum under epidural anesthesia    Findings  1)Disrupted hymen    2)2nd degree perineal laceration    3)Laceration to the medial aspect of the right labia minora    4)Laceration to the medial aspect of the left labia minora    5)Laceration to the lateral aspect of the left labia minora with bisection into a superior and inferior flap    Anesthesia: epidural; 30cc 1% Lidocaine    Repair with 3-0 Vicryl to the hymen and 2nd degree; 4-0 Vicryl for the repair of the labia

## 2017-05-05 NOTE — OP NOTE
DATE OF PROCEDURE:  2017.      INDICATIONS:  Jayna Sheridan is an otherwise healthy, 32-year-old now para 1 female who experienced a spontaneous vaginal delivery at 39 weeks and 3 days gestation with the assistance of Yadira Encinas.  She had an epidural for anesthesia.  She had a 2-hour second stage.  I was called to evaluate the laceration.  The findings included:   1.  A disrupted hymen.   2.  A second-degree perineal laceration.   3.  A laceration to the medial aspect of the right labia minora.   4.  A laceration to the medial aspect of the left labia minora.   5.  A laceration to the lateral aspect of the labia minora with total bisection of the external labia into a superior and inferior flap.      DESCRIPTION OF PROCEDURE:  The patient did have an epidural; however, anesthesia was supplemented with 30 mL of 1% lidocaine.      The hymen was reconstructed with stitches of 3-0 Vicryl suture.  Then, standard second-degree perineal laceration was repaired with 3-0 Vicryl suture in layers.      The right labia minora was repaired with 3-0 Vicryl suture in a subcuticular fashion.      The left labia minora required a subcuticular stitch of 4-0 Vicryl suture on the medial aspect and a separate subcuticular stitch on the lateral aspect.  Multiple interrupted stitches were required to address the area where the labia had been bisected.  At the completion of the procedure, hemostasis was noted.  Needle and sponge counts were correct.         SARA DAN MD             D: 2017 19:21   T: 2017 10:11   MT: YESENIA      Name:     JAYNA SHERIDAN   MRN:      [id number]        Account:        [id number]   :      1985           Procedure Date: 2017      Document: [id number]

## 2018-04-04 ENCOUNTER — FCC EXTENDED DOCUMENTATION (OUTPATIENT)
Dept: PSYCHOLOGY | Facility: CLINIC | Age: 33
End: 2018-04-04

## 2018-04-04 NOTE — PROGRESS NOTES
Discharge Summary  Single Session    Client Name: Adan Sheridan MRN#: [id number] YOB: 1985      Intake / Discharge Date: 10-15-15      DSM5 Diagnoses: (Sustained by DSM5 Criteria Listed Above)  Diagnoses: Adjustment Disorders 309.0 (F43.21) With depressed mood  Psychosocial & Contextual Factors: Recent miscarriage and struggles concerning relationship with mother.   WHODAS 2.0 (12 item)  This questionnaire asks about difficulties due to health conditions. Health conditions  include disease or illnesses, other health problems that may be short or long lasting,  injuries, mental health or emotional problems, and problems with alcohol or drugs.  Think back over the past 30 days and answer these questions, thinking about how much  difficulty you had doing the following activities. For each question, please [place] only one  response.          S1  Standing for long periods such as 30 minutes?  None = 1     S2  Taking care of household responsibilities?  None = 1     S3  Learning a new task, for example, learning how to get to a new place?  None = 1     S4  How much of a problem do you have joining community activities (for example, festivals, [employer] or other activities) in the same way as anyone else can?  None = 1     S5  How much have you been emotionally affected by your health problems?  Moderate = 3     In the past 30 days, how much difficulty did you have in:    S6  Concentrating on doing something for ten minutes?  Moderate = 3    S7  Walking a long distance such as a kilometer (or equivalent)?  None = 1    S8  Washing your whole body?  None = 1    S9  Getting dressed?  None = 1    S10  Dealing with people you do not know?  Mild = 2    S11  Maintaining a friendship?  Mild = 2    S12  Your day to day work?  Moderate = 3       H1  Overall, in the past 30 days, how many days were these difficulties present?  Record number of days 20    H2  In the past 30 days, for how many days  were you totally unable to carry out your usual activities or work because of any health condition?  Record number of days 10    H3  In the past 30 days, not counting the days that you were totally unable, for how many days did you cut back or reduce your usual activities or work because of any health condition?  Record number of days 14               Presenting Concern:  Recent miscarriage and relational issues       Reason for Discharge:  Client did not return      Disposition at Time of Last Encounter:   Comments:   Client completed diagnostic assessment      Risk Management:   Client denies a history of suicidal ideation, suicide attempts, self-injurious behavior, homicidal ideation, homicidal behavior and and other safety concerns  A safety and risk management plan has not been developed at this time, however client was given the after-hours number / 911 should there be a change in any of these risk factors.      Referred To:  Does not apply         Brenda Oquendo, TH 4/4/2018

## 2019-06-07 ENCOUNTER — TRANSFERRED RECORDS (OUTPATIENT)
Dept: PHYSICAL THERAPY | Facility: CLINIC | Age: 34
End: 2019-06-07

## 2019-07-29 ENCOUNTER — THERAPY VISIT (OUTPATIENT)
Dept: PHYSICAL THERAPY | Facility: CLINIC | Age: 34
End: 2019-07-29
Payer: COMMERCIAL

## 2019-07-29 DIAGNOSIS — R32 INCONTINENCE: ICD-10-CM

## 2019-07-29 DIAGNOSIS — R10.2 PELVIC PAIN: ICD-10-CM

## 2019-07-29 PROCEDURE — 97535 SELF CARE MNGMENT TRAINING: CPT | Mod: GP | Performed by: PHYSICAL THERAPIST

## 2019-07-29 PROCEDURE — 97112 NEUROMUSCULAR REEDUCATION: CPT | Mod: GP | Performed by: PHYSICAL THERAPIST

## 2019-07-29 PROCEDURE — 97161 PT EVAL LOW COMPLEX 20 MIN: CPT | Mod: GP | Performed by: PHYSICAL THERAPIST

## 2019-07-29 NOTE — LETTER
[facility]  [address] [place] [id number] [phone]    2019    Re: Adan Sheridan   :   1985  MRN:  [id number]   REFERRING PHYSICIAN:   Blessing Chiang    [facility]  Date of Initial Evaluation:  19  Visits:  Rxs Used: 1  Reason for Referral:     Pelvic pain  Incontinence    EVALUATION SUMMARY    [facility] Initial Evaluation  Subjective:  The history is provided by the patient. No  was used.                  Objective:  Pelvic Dysfunction Evaluation:    Bladder/Pelvic Problems:    Storage Problem:  Frequency, mixed incontinence and urgency  Emptying Problem:  Postvoid dribbling and dyspareunia  Dyspareunia:  Grade 3    Diagnostic Tests:      UA/Urine Sample:  Neg    Abdominal Wall:  normal    Pelvic Clock Exam:    Ischiocavernosis pain:  -  Bulbocavernosis pain:  -  Transverse Perineal:  +  Levator ANI:  +    External Assessment:    Skin Condition:  Normal  Scars:  Well healed and tender  Bearing Down/Coughing:  Normal    Muscle Contraction/Perineal Mobility:  Slight lift, no urogential triangle descent  Internal Assessment:  Internal assessment pelvic: dec recruiment PFM left compared to right.    Contraction/Grade:  Weak squeeze, 2 second hold (2)    SEMG Biofeedback:    Baseline EMG PM:  1.6 mV  Peak pelvic muscle contraction:  12 mV  Position:  Supine    Re: Adan Sheridan   :   1985    Additional History:  Delivery History:  Vaginal delivery    Number of Live Births: 1 very long labor w severe tearing, multiple repairs; DN     History of current episode:  Onset date/MD order: 2017 due to delivery trauma  CC/Present symptoms: urinary urgency/frequency,SUNG, dyspareunia.   Pain rating (0-10): 0/10  Conditioning is improving/unchanging/worsening: unchanging    Pelvic/Abdominal Surgeries:surgical repairs of perineal, labia, and urethral tears  Hx of  or present sexually transmitted disease:no  SMHx: Severe tearing with repairs initial, 2 weeks, and 6 weeks.  Tears: Urethral, perineum, and labia. Longstanding hx of urinary urgency and frequency prior to delivery; hx of pelvic pain with penetration  Current occupation:   Current activity: jogging limited due to leaking, low impact weights  Goals: correct bladder issues and dec pain w intercourse  Red flags:no    Urination:  Do you leak on the way to the bathroom or with a strong urge to void? Yes   Do you leak with cough,sneeze, jumping, running?Yes   Any other activities that cause leaking? Yes- bending over, getting onto bed  Do you have triggers that make you feel you can't wait to go to the bathroom? Yes What are they? Too much liquid in a short amount of time.  Type of pad and number used per day? Pantyliners  When you leak what is the amount? Small to large  How long can you delay the need to urinate? 11 - 30 minutes.   Do you feel excessive pressure in pelvic floor:not anymore but does report some of this right after birth..    Frequency of daytime urination:several  Frequency of nighttime urination:1-2  Can you stop the flow of urine when on the toilet? For a second  Is the volume of urine passed usually: average. (8sec rule= 250ml with average bladder storing 400-600ml)  Do you strain to pass urine? No  Do you have a slow or hesitant urinary stream? No  Do you have difficulty initiating the urine stream? No  Is urination painful? No  How many bladder infections have you had in last 12 months?0  Fluid intake(one glass is 8oz or one cup) 6 glasses/day, 2 caffinated glasses/day  1 alcohol glasses/day.  Bowel habits:  Frequency of bowel movements? 1 times a day  Consistancy of stool? soft formed, [place] Stool Scale 4  Do you ignore the urge to defecate? No  Do you strain to pass stool? No  Pelvic Pain:  Do you have any pelvic pain with intercourse, exams, use of tampons? Yes  Is initial  penetration during intercourse painful? Yes  Is deeper penetration painful? Yes  Do you use lubricant? Yes What kind? astrogel  Are you sexually active?Yes  Have you ever been worried for your physical safety? No  Have you practiced the PF(kegel) exercises for 4 or more weeks?no  Re: Adan Sheridan   :   1985    Marinoff Scale:Level 3  (Level 3: Abstinence from intercourse because of severe pain. Level 2: Painful intercourse which limites frequency of activity. Level 1: Painful intercourse not severe enough to prevent activity.)    Treatment/Education provided this session (see flow sheet for additional information):    Self Care Management/Patient education (24 min): Today's session consisted of education regarding pelvic floor muscle anatomy, normal bladder function, urge suppression techniques and/or relaxation techniques as indicated, and instruction in how to complete a bladder diary for assessment next visit. Depending on patient presentation, timed voids, double voids, and proper fluid/fiber intake discussed. Pt was instructed in the pathoanatomy of the pelvic floor utilizing pelvic model.  We discussed what pelvic floor physical therapy is, components of exam, and typical patient progression. Prior to internal PFM exam,  patient was told that they were in control of exam progression, and if at any time were uncomfortable and wished to discontinue we would.     Pt education regarding contributing factors to pelvic pain and dysfunction related to overactivity in the pelvic floor.  Included resources for relaxed awareness and pelvic floor quieting techniqes.  Extra time spent describing pelvic floor muscle exam and treatment plan/goals, with attention to potential history that may contribute to current symptoms.  Included resources for home release techniques using dilators and/or thera wand as indicated.  Recommended partner involvement if available.  Discussed in detail potential physiological and  behavioral components of pelvic pain.  Demonstrated/performed techniques for input to painful area while using visualization and relaxation.      ASSESSMENT/PLAN:  Patient is a 34 year old female with pelvic complaints.    Patient has the following significant findings with corresponding treatment plan.                Diagnosis 1:  SUNG Diagnosis 2: Pelvic pain   Pain -  manual therapy, self management, education, directional preference exercise and home program  Decreased ROM/flexibility - manual therapy and therapeutic exercise  Decreased joint mobility - manual therapy and therapeutic exercise  Decreased strength - therapeutic exercise and therapeutic activities  Decreased proprioception - neuro re-education and therapeutic activities  Impaired gait - gait training  Impaired muscle performance - neuro re-education  Decreased function - therapeutic activities  Impaired posture - neuro re-education    Previous and current functional limitations:  (See Goal Flow Sheet for this information)    Short term and Long term goals: (See Goal Flow Sheet for this information)     Communication ability:  Patient appears to be able to clearly communicate and understand verbal and written communication and follow directions correctly.  Treatment Explanation - The following has been discussed with the patient:   RX ordered/plan of care  Anticipated outcomes  Possible risks and side effects  This patient would benefit from PT intervention to resume normal activities.   Rehab potential is good.    Frequency:  1X week, once daily  Duration:  for 12 weeks  Discharge Plan:  Achieve all LTG.  Independent in home treatment program.  Reach maximal therapeutic benefit.      Re: Adan Sheridan   :   1985    Please refer to the daily flowsheet for treatment today, total treatment time and time spent performing 1:1 timed codes.     Thank you for your referral.    INQUIRIES  Therapist: Chula Edward, PT, OCS, Cert. MDT   [facility]  [address] [place] [id number]  Phone: [phone]  Fax: [phone]

## 2019-07-29 NOTE — PROGRESS NOTES
[facility] Initial Evaluation  Subjective:  The history is provided by the patient. No  was used.                       Objective:  System                                 Pelvic Dysfunction Evaluation:    Bladder/Pelvic Problems:    Storage Problem:  Frequency, mixed incontinence and urgency  Emptying Problem:  Postvoid dribbling and dyspareunia  Dyspareunia:  Grade 3    Diagnostic Tests:      UA/Urine Sample:  Neg                      Abdominal Wall:  normal        Pelvic Clock Exam:    Ischiocavernosis pain:  -  Bulbocavernosis pain:  -  Transverse Perineal:  +  Levator ANI:  +        External Assessment:    Skin Condition:  Normal  Scars:  Well healed and tender  Bearing Down/Coughing:  Normal      Muscle Contraction/Perineal Mobility:  Slight lift, no urogential triangle descent  Internal Assessment:  Internal assessment pelvic: dec recruiment PFM left compared to right.    Contraction/Grade:  Weak squeeze, 2 second hold (2)          SEMG Biofeedback:          Baseline EMG PM:  1.6 mV    Peak pelvic muscle contraction:  12 mV      Position:  SupineAdditional History:  Delivery History:  Vaginal delivery    Number of Live Births: 1 very long labor w severe tearing, multiple repairs; DNC 2015                       General     ROS History of current episode:    Onset date/MD order: 5/1/2017 due to delivery trauma  CC/Present symptoms: urinary urgency/frequency,SUNG, dyspareunia.   Pain rating (0-10): 0/10  Conditioning is improving/unchanging/worsening: unchanging    Pelvic/Abdominal Surgeries:surgical repairs of perineal, labia, and urethral tears  Hx of or present sexually transmitted disease:no  SMHx: Severe tearing with repairs initial, 2 weeks, and 6 weeks.  Tears: Urethral, perineum, and labia. Longstanding hx of urinary urgency and frequency prior to delivery; hx of pelvic pain with penetration  Current occupation:   Current activity: jogging limited due  to leaking, low impact weights  Goals: correct bladder issues and dec pain w intercourse  Red flags:no    Urination:  Do you leak on the way to the bathroom or with a strong urge to void? Yes   Do you leak with cough,sneeze, jumping, running?Yes   Any other activities that cause leaking? Yes- bending over, getting onto bed  Do you have triggers that make you feel you can't wait to go to the bathroom? Yes What are they? Too much liquid in a short amount of time.  Type of pad and number used per day? Pantyliners  When you leak what is the amount? Small to large  How long can you delay the need to urinate? 11 - 30 minutes.   Do you feel excessive pressure in pelvic floor:not anymore but does report some of this right after birth..    Frequency of daytime urination:several  Frequency of nighttime urination:1-2  Can you stop the flow of urine when on the toilet? For a second  Is the volume of urine passed usually: average. (8sec rule= 250ml with average bladder storing 400-600ml)  Do you strain to pass urine? No  Do you have a slow or hesitant urinary stream? No  Do you have difficulty initiating the urine stream? No  Is urination painful? No  How many bladder infections have you had in last 12 months?0  Fluid intake(one glass is 8oz or one cup) 6 glasses/day, 2 caffinated glasses/day  1 alcohol glasses/day.  Bowel habits:  Frequency of bowel movements? 1 times a day  Consistancy of stool? soft formed, Blount Stool Scale 4  Do you ignore the urge to defecate? No  Do you strain to pass stool? No  Pelvic Pain:  Do you have any pelvic pain with intercourse, exams, use of tampons? Yes  Is initial penetration during intercourse painful? Yes  Is deeper penetration painful? Yes  Do you use lubricant? Yes What kind? astrogel  Are you sexually active?Yes  Have you ever been worried for your physical safety? No  Have you practiced the PF(kegel) exercises for 4 or more weeks?no  Marinoff Scale:Level 3  (Level 3: Abstinence from  intercourse because of severe pain. Level 2: Painful intercourse which limites frequency of activity. Level 1: Painful intercourse not severe enough to prevent activity.)    Treatment/Education provided this session (see flow sheet for additional information):    Self Care Management/Patient education (24 min): Today's session consisted of education regarding pelvic floor muscle anatomy, normal bladder function, urge suppression techniques and/or relaxation techniques as indicated, and instruction in how to complete a bladder diary for assessment next visit. Depending on patient presentation, timed voids, double voids, and proper fluid/fiber intake discussed. Pt was instructed in the pathoanatomy of the pelvic floor utilizing pelvic model.  We discussed what pelvic floor physical therapy is, components of exam, and typical patient progression. Prior to internal PFM exam,  patient was told that they were in control of exam progression, and if at any time were uncomfortable and wished to discontinue we would.     Pt education regarding contributing factors to pelvic pain and dysfunction related to overactivity in the pelvic floor.  Included resources for relaxed awareness and pelvic floor quieting techniqes.  Extra time spent describing pelvic floor muscle exam and treatment plan/goals, with attention to potential history that may contribute to current symptoms.  Included resources for home release techniques using dilators and/or thera wand as indicated.  Recommended partner involvement if available.  Discussed in detail potential physiological and behavioral components of pelvic pain.  Demonstrated/performed techniques for input to painful area while using visualization and relaxation.              ASSESSMENT/PLAN:    Patient is a 34 year old female with pelvic complaints.    Patient has the following significant findings with corresponding treatment plan.                Diagnosis 1:  SUNG Diagnosis 2: Pelvic pain    Pain -  manual therapy, self management, education, directional preference exercise and home program  Decreased ROM/flexibility - manual therapy and therapeutic exercise  Decreased joint mobility - manual therapy and therapeutic exercise  Decreased strength - therapeutic exercise and therapeutic activities  Decreased proprioception - neuro re-education and therapeutic activities  Impaired gait - gait training  Impaired muscle performance - neuro re-education  Decreased function - therapeutic activities  Impaired posture - neuro re-education    Previous and current functional limitations:  (See Goal Flow Sheet for this information)    Short term and Long term goals: (See Goal Flow Sheet for this information)     Communication ability:  Patient appears to be able to clearly communicate and understand verbal and written communication and follow directions correctly.  Treatment Explanation - The following has been discussed with the patient:   RX ordered/plan of care  Anticipated outcomes  Possible risks and side effects  This patient would benefit from PT intervention to resume normal activities.   Rehab potential is good.    Frequency:  1X week, once daily  Duration:  for 12 weeks  Discharge Plan:  Achieve all LTG.  Independent in home treatment program.  Reach maximal therapeutic benefit.    Please refer to the daily flowsheet for treatment today, total treatment time and time spent performing 1:1 timed codes.

## 2019-08-12 ENCOUNTER — THERAPY VISIT (OUTPATIENT)
Dept: PHYSICAL THERAPY | Facility: CLINIC | Age: 34
End: 2019-08-12
Payer: COMMERCIAL

## 2019-08-12 DIAGNOSIS — R32 INCONTINENCE: ICD-10-CM

## 2019-08-12 PROCEDURE — 97535 SELF CARE MNGMENT TRAINING: CPT | Mod: GP | Performed by: PHYSICAL THERAPIST

## 2019-08-12 PROCEDURE — 97112 NEUROMUSCULAR REEDUCATION: CPT | Mod: GP | Performed by: PHYSICAL THERAPIST

## 2019-08-12 PROCEDURE — 97140 MANUAL THERAPY 1/> REGIONS: CPT | Mod: GP | Performed by: PHYSICAL THERAPIST

## 2019-10-15 NOTE — PROGRESS NOTES
Discharge Note    Progress reporting period is from last progress note on July 29 to Aug 12, 2019.    Adan failed to follow up and current status is unknown.  Please see information below for last relevant information on current status.  Patient seen for 2 visits.    SUBJECTIVE  Subjective changes noted by patient:  Trying to use quick flicks. Feels like her muscles are lucius.   .  Current pain level is 1/10.     Previous pain level was   .   Changes in function:  Yes (See Goal flowsheet attached for changes in current functional level)  Adverse reaction to treatment or activity: None    OBJECTIVE  Changes noted in objective findings: Imp pain tolerance w internal work but notes she  will have involuntary guarding with attempts at intercourse     ASSESSMENT/PLAN  Diagnosis: SUNG   Updated problem list and treatment plan:   Pain - HEP  Decreased ROM/flexibility - HEP  Decreased function - HEP  Decreased strength - HEP  Impaired muscle performance - HEP  Impaired gait - HEP  Decreased proprioception - HEP  Impaired posture - HEP  Decreased joint mobility - HEP  STG/LTGs have been met or progress has been made towards goals:  Yes, please see goal flowsheet for most current information  Assessment of Progress: current status is unknown.    Last current status: Pt is progressing as expected   Self Management Plans:  HEP  I have re-evaluated this patient and find that the nature, scope, duration and intensity of the therapy is appropriate for the medical condition of the patient.  Adan continues to require the following intervention to meet STG and LTG's:  HEP.    Recommendations:  Discharge with current home program.  Patient to follow up with MD as needed.    Please refer to the daily flowsheet for treatment today, total treatment time and time spent performing 1:1 timed codes.

## 2019-12-09 PROBLEM — R32 INCONTINENCE: Status: RESOLVED | Noted: 2019-07-29 | Resolved: 2019-12-09

## 2024-08-21 ENCOUNTER — LAB REQUISITION (OUTPATIENT)
Dept: LAB | Facility: CLINIC | Age: 39
End: 2024-08-21
Payer: COMMERCIAL

## 2024-08-21 DIAGNOSIS — N93.9 ABNORMAL UTERINE AND VAGINAL BLEEDING, UNSPECIFIED: ICD-10-CM

## 2024-08-21 PROCEDURE — 82166 ASSAY ANTI-MULLERIAN HORM: CPT | Mod: ORL | Performed by: OBSTETRICS & GYNECOLOGY

## 2024-08-21 PROCEDURE — 84146 ASSAY OF PROLACTIN: CPT | Mod: ORL | Performed by: OBSTETRICS & GYNECOLOGY

## 2024-08-22 LAB
MIS SERPL-MCNC: 0.73 NG/ML (ref 0.15–7.5)
PROLACTIN SERPL 3RD IS-MCNC: 7 NG/ML (ref 5–23)

## 2024-08-26 ENCOUNTER — LAB REQUISITION (OUTPATIENT)
Dept: LAB | Facility: CLINIC | Age: 39
End: 2024-08-26
Payer: COMMERCIAL

## 2024-08-26 DIAGNOSIS — N93.9 ABNORMAL UTERINE AND VAGINAL BLEEDING, UNSPECIFIED: ICD-10-CM

## 2024-08-26 PROCEDURE — 82670 ASSAY OF TOTAL ESTRADIOL: CPT | Mod: ORL | Performed by: OBSTETRICS & GYNECOLOGY

## 2024-08-26 PROCEDURE — 83002 ASSAY OF GONADOTROPIN (LH): CPT | Mod: ORL | Performed by: OBSTETRICS & GYNECOLOGY

## 2024-08-26 PROCEDURE — 83001 ASSAY OF GONADOTROPIN (FSH): CPT | Mod: ORL | Performed by: OBSTETRICS & GYNECOLOGY

## 2024-08-26 PROCEDURE — 84144 ASSAY OF PROGESTERONE: CPT | Mod: ORL | Performed by: OBSTETRICS & GYNECOLOGY

## 2024-08-27 LAB
ESTRADIOL SERPL-MCNC: 58 PG/ML
FSH SERPL IRP2-ACNC: 5.8 MIU/ML
LH SERPL-ACNC: 4.7 MIU/ML
PROGEST SERPL-MCNC: 2.8 NG/ML